# Patient Record
Sex: FEMALE | Race: WHITE
[De-identification: names, ages, dates, MRNs, and addresses within clinical notes are randomized per-mention and may not be internally consistent; named-entity substitution may affect disease eponyms.]

---

## 2018-05-17 ENCOUNTER — HOSPITAL ENCOUNTER (OUTPATIENT)
Dept: HOSPITAL 17 - HSDC | Age: 73
Discharge: HOME | End: 2018-05-17
Attending: PLASTIC SURGERY
Payer: MEDICARE

## 2018-05-17 VITALS
HEART RATE: 90 BPM | TEMPERATURE: 97.7 F | RESPIRATION RATE: 20 BRPM | DIASTOLIC BLOOD PRESSURE: 65 MMHG | OXYGEN SATURATION: 94 % | SYSTOLIC BLOOD PRESSURE: 115 MMHG

## 2018-05-17 VITALS — BODY MASS INDEX: 37.97 KG/M2 | HEIGHT: 63 IN | WEIGHT: 214.29 LBS

## 2018-05-17 DIAGNOSIS — E11.9: ICD-10-CM

## 2018-05-17 DIAGNOSIS — M65.30: Primary | ICD-10-CM

## 2018-05-17 DIAGNOSIS — I10: ICD-10-CM

## 2018-05-17 DIAGNOSIS — Z01.810: ICD-10-CM

## 2018-05-17 PROCEDURE — 93005 ELECTROCARDIOGRAM TRACING: CPT

## 2018-05-17 PROCEDURE — 01810 ANES PX NRV MUSC F/ARM WRST: CPT

## 2018-05-17 PROCEDURE — 26055 INCISE FINGER TENDON SHEATH: CPT

## 2018-05-17 NOTE — PD.OP
__________________________________________________





Operative Report


Date of Surgery:  May 17, 2018


Preoperative Diagnosis:  


(1) Trigger finger of left hand


Postoperative Diagnosis:  


(1) Trigger finger of left hand


Procedure:


Left ring finger A1 pulley release (03088)


Anesthesia:


General


Surgeon:


Elieser Rubio


Assistant(s):


.


Operation and Findings:


This is a 73-year-old female who presented to clinic with signs and symptoms 

consistent with a left ring finger trigger finger.  Risks benefits alternative 

treatments were discussed.  Patient elected to assume the risks of left ring 

finger A1 pulley release.  Informed consent was obtained.





The surgical site was marked in the preoperative holding bay.  The patient was 

given antibiotics on-call to the operating room.  The patient was taken to the 

operating room and all pressure points were padded.  A surgical timeout was 

performed.  After the smooth induction of general anesthesia, the surgical site 

was instilled with half percent Marcaine with epinephrine.  





The surgical site was prepped and draped in the usual sterile fashion.  An 

Esmarch bandage was used to exsanguinate the extremity and an appropriately 

padded upper extremity tourniquet was inflated to 200 mmHg.  A longitudinal 

incision was made over the volar fourth metacarpal head.  Blunt dissection was 

carried down to the flexor tendon sheath.  The A1 pulley was sharply incised 

with a 15 blade.  Following this the tenotomy scissors were used to extend the 

incision proximally and distally.  The A1 pulley was confirmed to be fully 

released.  The FDS and FDP were delivered into the wound and freed from 

adhesions.  The ring finger was repeatedly flexed and extended to confirm 

unrestricted gliding of the tendons.  Following this the surgical site was 

copiously irrigated.  The skin was closed with interrupted 4-0 nylons in a 

horizontal mattress fashion.  The surgical site was cleaned and dressed with 

mupirocin ointment Xeroform gauze dry gauze fluffs and an appropriately padded 

volar splint.  The tourniquet was released at 23 minutes.  All digits pinked up 

nicely.  All needle sponge and instrument counts were correct 2.  Patient was 

awoken from anesthesia and arrived stable doing well to the PACU.











Eleiser Rubio MD May 17, 2018 10:49

## 2018-05-18 NOTE — EKG
Date Performed: 05/17/2018       Time Performed: 06:51:42

 

PTAGE:      73 years

 

EKG:      Sinus rhythm 

 

 NONSPECIFIC T-WAVE ABNORMALITY BORDERLINE ECG 

 

NO PREVIOUS TRACING            

 

DOCTOR:   Marcelino Carrington  Interpretating Date/Time  05/18/2018 11:45:50